# Patient Record
(demographics unavailable — no encounter records)

---

## 2024-10-29 NOTE — HISTORY OF PRESENT ILLNESS
[FreeTextEntry1] : light patches  [de-identified] :  New patient, here for light patch on chin left face and neck - present for few months denies preceding rash  also with light patches on shins - no treatments tried

## 2024-10-29 NOTE — ASSESSMENT
[FreeTextEntry1] : Hypopigmented patches on face New diagnosis,  uncertain clinical course Differential includes early vitiligo Trial of tacrolimus ointment- reviewed possible side effects of burning/stinging on initial application reassess in 12 weeks counseled on sun protection and emollients pt has had regular thyroid screening with pcp

## 2025-03-25 NOTE — ASSESSMENT
[FreeTextEntry1] : Favor vitiligo Chronic, flaring  -I have discussed the nature and usual course with the patient. I have discussed treatment options, expectations from treatment, and associated side effects of topical therapies. BSA 5%  failed topical calcineurin inhibitors We will prescribe clobetasol ointment 1 week on, 1 week off. Side effects of long term use of topical steroids discussed with patient including but not limited to thinning of the skin, atrophy and dyspigmentation. we will also rx opzelura - will let patient know if approved  rtc 3 months photographs taken today for comparison

## 2025-03-25 NOTE — HISTORY OF PRESENT ILLNESS
[FreeTextEntry1] : followup [de-identified] : last seen 10/24 using tacrolimus  noticing progressing area is sensitive when food/salt gets on it

## 2025-07-15 NOTE — ASSESSMENT
[FreeTextEntry1] : Favor vitiligo Chronic, flaring  -I have discussed the nature and usual course with the patient. I have discussed treatment options, expectations from treatment, and associated side effects of topical therapies. BSA 5%  failed topical calcineurin inhibitors We will prescribe triamcinolone ointment 1 week on, 1 week off. Side effects of long term use of topical steroids discussed with patient including but not limited to thinning of the skin, atrophy and dyspigmentation. we will also give opzelura samples to use during break week  rtc 3 months photographs taken today for comparison   discussed excimer laser - pt defers today, consider in followup

## 2025-07-15 NOTE — HISTORY OF PRESENT ILLNESS
[FreeTextEntry1] : followup [de-identified] : last seen 3/2025 using tacrolimus  noticing progressing opzelura not covered